# Patient Record
Sex: FEMALE | Race: WHITE | URBAN - METROPOLITAN AREA
[De-identification: names, ages, dates, MRNs, and addresses within clinical notes are randomized per-mention and may not be internally consistent; named-entity substitution may affect disease eponyms.]

---

## 2021-03-10 ENCOUNTER — OFFICE VISIT (OUTPATIENT)
Dept: OTOLARYNGOLOGY | Facility: CLINIC | Age: 56
End: 2021-03-10
Payer: MEDICAID

## 2021-03-10 VITALS
SYSTOLIC BLOOD PRESSURE: 129 MMHG | HEIGHT: 63 IN | DIASTOLIC BLOOD PRESSURE: 69 MMHG | WEIGHT: 211 LBS | BODY MASS INDEX: 37.39 KG/M2 | TEMPERATURE: 98 F

## 2021-03-10 DIAGNOSIS — L73.9 NASAL FOLLICULITIS: Primary | ICD-10-CM

## 2021-03-10 PROCEDURE — 3008F BODY MASS INDEX DOCD: CPT | Performed by: OTOLARYNGOLOGY

## 2021-03-10 PROCEDURE — 3074F SYST BP LT 130 MM HG: CPT | Performed by: OTOLARYNGOLOGY

## 2021-03-10 PROCEDURE — 3078F DIAST BP <80 MM HG: CPT | Performed by: OTOLARYNGOLOGY

## 2021-03-10 PROCEDURE — 99244 OFF/OP CNSLTJ NEW/EST MOD 40: CPT | Performed by: OTOLARYNGOLOGY

## 2021-03-10 RX ORDER — FLUOXETINE HYDROCHLORIDE 20 MG/1
CAPSULE ORAL
COMMUNITY
Start: 2020-04-16

## 2021-03-10 RX ORDER — DOXYCYCLINE HYCLATE 100 MG
TABLET ORAL
COMMUNITY
Start: 2021-03-03

## 2021-03-10 RX ORDER — ATORVASTATIN CALCIUM 40 MG/1
40 TABLET, FILM COATED ORAL DAILY
COMMUNITY
Start: 2021-02-12

## 2021-03-10 RX ORDER — METFORMIN HYDROCHLORIDE 500 MG/1
TABLET, EXTENDED RELEASE ORAL
COMMUNITY
Start: 2020-11-02

## 2021-03-10 RX ORDER — LIRAGLUTIDE 6 MG/ML
INJECTION SUBCUTANEOUS
COMMUNITY
Start: 2021-02-19

## 2021-03-10 RX ORDER — INSULIN GLARGINE 100 [IU]/ML
INJECTION, SOLUTION SUBCUTANEOUS
COMMUNITY
Start: 2020-12-02

## 2021-03-10 RX ORDER — LISINOPRIL 5 MG/1
5 TABLET ORAL DAILY
COMMUNITY
Start: 2020-11-03

## 2021-03-10 RX ORDER — NATEGLINIDE 60 MG/1
TABLET, COATED ORAL
COMMUNITY
Start: 2020-06-16

## 2021-03-10 NOTE — PROGRESS NOTES
Torrance Schaumann is a 60year old female. Patient presents with:  Nose Problem: new pt. pt c/o nose scabing for 1 week. HISTORY OF PRESENT ILLNESS  3/10/2021  Patient prevents for evaluation of nasal discomfort,  pain and crusting . Jarrod Fragoso This is recurrent. family history on file.   Past Medical History:   Diagnosis Date   • Calculus of kidney    • Diabetes ([facility] [address]    • Essential hypertension    • Hyperlipidemia        Past Surgical History:   Procedure Laterality Date   • TONSILLECTOMY           REVIEW OF SYSTMARKO Nose/Mouth/Throat abNormal Nares -large 1 cm crust over the right nasal vestibule with some redness of the tip of the nose normal Left: Normal. Septum deviated with turbinate hypertrophy bilaterally mucosa congestion.  crusting is noted in right nasal cav

## 2021-03-13 ENCOUNTER — TELEPHONE (OUTPATIENT)
Dept: OTOLARYNGOLOGY | Facility: CLINIC | Age: 56
End: 2021-03-13

## 2021-03-13 NOTE — TELEPHONE ENCOUNTER
LMTCB- per , culture showing natural betty in nose, per  if OTC ointment not helping, can send in rx for Mupirocin external ointment 1 application BID for 10 days.

## 2022-08-04 ENCOUNTER — WALK IN (OUTPATIENT)
Dept: URGENT CARE | Age: 57
End: 2022-08-04

## 2022-08-04 ENCOUNTER — TELEPHONE (OUTPATIENT)
Dept: SCHEDULING | Age: 57
End: 2022-08-04

## 2022-08-04 DIAGNOSIS — Z11.1 SCREENING-PULMONARY TB: Primary | ICD-10-CM

## 2022-08-04 PROCEDURE — 86580 TB INTRADERMAL TEST: CPT | Performed by: NURSE PRACTITIONER

## 2022-08-05 ENCOUNTER — TELEPHONE (OUTPATIENT)
Dept: SCHEDULING | Age: 57
End: 2022-08-05

## 2022-08-06 ENCOUNTER — WALK IN (OUTPATIENT)
Dept: URGENT CARE | Age: 57
End: 2022-08-06

## 2022-08-06 DIAGNOSIS — Z11.1 ENCOUNTER FOR PPD SKIN TEST READING: Primary | ICD-10-CM

## 2022-08-06 LAB
INDURATION: 0 MM (ref 0–?)
SKIN TEST RESULT: NEGATIVE

## 2022-08-19 ENCOUNTER — APPOINTMENT (OUTPATIENT)
Dept: URGENT CARE | Age: 57
End: 2022-08-19

## 2022-08-19 ENCOUNTER — TELEPHONE (OUTPATIENT)
Dept: SCHEDULING | Age: 57
End: 2022-08-19